# Patient Record
Sex: FEMALE | Race: WHITE | NOT HISPANIC OR LATINO | Employment: FULL TIME | URBAN - METROPOLITAN AREA
[De-identification: names, ages, dates, MRNs, and addresses within clinical notes are randomized per-mention and may not be internally consistent; named-entity substitution may affect disease eponyms.]

---

## 2021-04-08 DIAGNOSIS — Z23 ENCOUNTER FOR IMMUNIZATION: ICD-10-CM

## 2025-01-14 ENCOUNTER — OFFICE VISIT (OUTPATIENT)
Dept: OBGYN CLINIC | Facility: CLINIC | Age: 30
End: 2025-01-14
Payer: COMMERCIAL

## 2025-01-14 VITALS
WEIGHT: 150 LBS | HEIGHT: 62 IN | BODY MASS INDEX: 27.6 KG/M2 | SYSTOLIC BLOOD PRESSURE: 116 MMHG | DIASTOLIC BLOOD PRESSURE: 68 MMHG

## 2025-01-14 DIAGNOSIS — N92.0 MENORRHAGIA WITH REGULAR CYCLE: Primary | ICD-10-CM

## 2025-01-14 PROCEDURE — 99214 OFFICE O/P EST MOD 30 MIN: CPT | Performed by: NURSE PRACTITIONER

## 2025-01-14 RX ORDER — DEXTROAMPHETAMINE SACCHARATE, AMPHETAMINE ASPARTATE, DEXTROAMPHETAMINE SULFATE AND AMPHETAMINE SULFATE 2.5; 2.5; 2.5; 2.5 MG/1; MG/1; MG/1; MG/1
1 TABLET ORAL DAILY
COMMUNITY
Start: 2024-12-16

## 2025-01-14 RX ORDER — FLUTICASONE PROPIONATE AND SALMETEROL XINAFOATE 115; 21 UG/1; UG/1
AEROSOL, METERED RESPIRATORY (INHALATION)
COMMUNITY
Start: 2024-12-26

## 2025-01-14 RX ORDER — TRAZODONE HYDROCHLORIDE 50 MG/1
TABLET, FILM COATED ORAL
COMMUNITY
Start: 2025-01-13

## 2025-01-14 RX ORDER — ALBUTEROL SULFATE AND BUDESONIDE 90; 80 UG/1; UG/1
AEROSOL, METERED RESPIRATORY (INHALATION)
COMMUNITY
Start: 2024-12-12

## 2025-01-14 RX ORDER — TIOTROPIUM BROMIDE INHALATION SPRAY 1.56 UG/1
SPRAY, METERED RESPIRATORY (INHALATION)
COMMUNITY
Start: 2024-12-19

## 2025-01-14 RX ORDER — EPINEPHRINE 0.3 MG/.3ML
INJECTION SUBCUTANEOUS
COMMUNITY
Start: 2024-11-27

## 2025-01-14 RX ORDER — PREDNISONE 20 MG/1
20 TABLET ORAL 2 TIMES DAILY WITH MEALS
COMMUNITY
Start: 2024-12-23

## 2025-01-14 RX ORDER — MONTELUKAST SODIUM 10 MG/1
10 TABLET ORAL EVERY EVENING
COMMUNITY
Start: 2024-12-22

## 2025-01-14 RX ORDER — ESCITALOPRAM OXALATE 10 MG/1
1 TABLET ORAL DAILY
COMMUNITY
Start: 2024-12-27

## 2025-01-14 RX ORDER — LISDEXAMFETAMINE DIMESYLATE 60 MG/1
1 CAPSULE ORAL DAILY
COMMUNITY
Start: 2024-12-16

## 2025-01-14 NOTE — PROGRESS NOTES
Assessment/Plan:  Menorrhagia with regular periods.  Possible causes include fibroids, polyps, normal menstrual changes, hyperplasia.  Discussed observation, hormonal treatment ( birth control pill or progesterone IUD) , Lysteda, NSAIDS. US to evaluate uterus, +/- endometrial bx if indicated , CBC, TSH  F/u after studies complete for WA and decide on management        Diagnoses and all orders for this visit:    Menorrhagia with regular cycle  -     CBC; Future  -     TSH, 3rd generation with Free T4 reflex; Future  -     US pelvis complete w transvaginal; Future  -     Iron, TIBC and Ferritin Panel; Future  -     CBC  -     TSH, 3rd generation with Free T4 reflex  -     Iron, TIBC and Ferritin Panel    Other orders  -     Airsupra 90-80 MCG/ACT AERO; inhale 2 puffs by mouth and INTO THE LUNGS every 4 hours if neede...  (REFER TO PRESCRIPTION NOTES).  -     traZODone (DESYREL) 50 mg tablet  -     escitalopram (LEXAPRO) 10 mg tablet; Take 1 tablet by mouth in the morning  -     Advair -21 MCG/ACT inhaler; inhale 2 puffs by mouth and INTO THE LUNGS every 12 hours , use w...  (REFER TO PRESCRIPTION NOTES).  -     montelukast (SINGULAIR) 10 mg tablet; Take 10 mg by mouth every evening  -     Spiriva Respimat 1.25 MCG/ACT AERS inhaler; inhale 2 puffs by mouth and INTO THE LUNGS once daily  -     predniSONE 20 mg tablet; Take 20 mg by mouth 2 (two) times a day with meals  -     amphetamine-dextroamphetamine (ADDERALL) 10 mg tablet; Take 1 tablet by mouth in the morning  -     EPINEPHrine (EPIPEN) 0.3 mg/0.3 mL SOAJ; inject as directed for ACUTE ALLERGIC REACTION  -     lisdexamfetamine (VYVANSE) 60 MG capsule; Take 1 capsule by mouth in the morning (Patient taking differently: Take 1 capsule by mouth in the morning 70mg)          Subjective:      Patient ID: Keiko Monae is a 29 y.o. female.    New pt here for consult heavy periods Declines WA as on period Not comfortable with exam today  GO Heavy menses for the  "past year with significant cramps Female partners No pain other times of the month Bowel and bladder are normal         The following portions of the patient's history were reviewed and updated as appropriate: allergies, current medications, past family history, past medical history, past social history, past surgical history, and problem list.    Review of Systems   Constitutional:  Negative for fatigue and unexpected weight change.   Gastrointestinal:  Negative for abdominal distention, abdominal pain, constipation and diarrhea.   Genitourinary:  Positive for menstrual problem. Negative for difficulty urinating, dyspareunia, dysuria, frequency, genital sores, pelvic pain, urgency, vaginal bleeding, vaginal discharge and vaginal pain.   Neurological:  Negative for headaches.   Psychiatric/Behavioral: Negative.  Negative for dysphoric mood. The patient is not nervous/anxious.          Objective:      /68 (BP Location: Right arm, Patient Position: Sitting, Cuff Size: Standard)   Ht 5' 2\" (1.575 m)   Wt 68 kg (150 lb)   LMP 01/13/2025 (Exact Date)   BMI 27.44 kg/m²          Physical Exam  Constitutional:       Appearance: Normal appearance.   HENT:      Head: Normocephalic and atraumatic.   Neurological:      General: No focal deficit present.      Mental Status: She is alert and oriented to person, place, and time.   Psychiatric:         Mood and Affect: Mood normal.         Behavior: Behavior normal.         "

## 2025-01-16 LAB
BASOPHILS # BLD AUTO: 0.1 X10E3/UL (ref 0–0.2)
BASOPHILS NFR BLD AUTO: 1 %
EOSINOPHIL # BLD AUTO: 0.3 X10E3/UL (ref 0–0.4)
EOSINOPHIL NFR BLD AUTO: 5 %
ERYTHROCYTE [DISTWIDTH] IN BLOOD BY AUTOMATED COUNT: 12.2 % (ref 11.7–15.4)
FERRITIN SERPL-MCNC: 30 NG/ML (ref 15–150)
HCT VFR BLD AUTO: 41.9 % (ref 34–46.6)
HGB BLD-MCNC: 14.1 G/DL (ref 11.1–15.9)
IMM GRANULOCYTES # BLD: 0 X10E3/UL (ref 0–0.1)
IMM GRANULOCYTES NFR BLD: 0 %
IRON SATN MFR SERPL: 14 % (ref 15–55)
IRON SERPL-MCNC: 49 UG/DL (ref 27–159)
LYMPHOCYTES # BLD AUTO: 2.4 X10E3/UL (ref 0.7–3.1)
LYMPHOCYTES NFR BLD AUTO: 34 %
MCH RBC QN AUTO: 33.3 PG (ref 26.6–33)
MCHC RBC AUTO-ENTMCNC: 33.7 G/DL (ref 31.5–35.7)
MCV RBC AUTO: 99 FL (ref 79–97)
MONOCYTES # BLD AUTO: 0.7 X10E3/UL (ref 0.1–0.9)
MONOCYTES NFR BLD AUTO: 11 %
NEUTROPHILS # BLD AUTO: 3.5 X10E3/UL (ref 1.4–7)
NEUTROPHILS NFR BLD AUTO: 49 %
PLATELET # BLD AUTO: 301 X10E3/UL (ref 150–450)
RBC # BLD AUTO: 4.24 X10E6/UL (ref 3.77–5.28)
TIBC SERPL-MCNC: 342 UG/DL (ref 250–450)
TSH SERPL DL<=0.005 MIU/L-ACNC: 1.12 UIU/ML (ref 0.45–4.5)
UIBC SERPL-MCNC: 293 UG/DL (ref 131–425)
WBC # BLD AUTO: 7.1 X10E3/UL (ref 3.4–10.8)

## 2025-01-16 NOTE — PATIENT INSTRUCTIONS
Menorrhagia with regular periods.  Possible causes include fibroids, polyps, normal menstrual changes, hyperplasia.  Discussed observation, hormonal treatment ( birth control pill or progesterone IUD) , Lysteda, NSAIDS. US to evaluate uterus, +/- endometrial bx if indicated , CBC, TSH  F/u after studies complete for WA and decide on management

## 2025-01-28 ENCOUNTER — TELEPHONE (OUTPATIENT)
Age: 30
End: 2025-01-28

## 2025-01-28 NOTE — TELEPHONE ENCOUNTER
Patient returning [facility] RN call about results. Warm transfer to [facility], unsuccessful. Please call patient back.     Patient gets out of work at 5pm.    PROVIDER:[TOKEN:[[id number]],FOLLOWUP:[1 month]],PROVIDER:[TOKEN:[[id number]],FOLLOWUP:[1 week]] PROVIDER:[TOKEN:[[id number]],FOLLOWUP:[1 month]],PROVIDER:[TOKEN:[[id number]],FOLLOWUP:[1 week]],PROVIDER:[TOKEN:[[id number]],FOLLOWUP:[1 month]]

## 2025-01-28 NOTE — TELEPHONE ENCOUNTER
Pt called to review lab results from 1/15 ordered by María Campos. Communicated labs have not yet been interpreted and that she will receive a F/U call to review results/recommendations once reviewed by provider.
